# Patient Record
Sex: MALE | Race: BLACK OR AFRICAN AMERICAN
[De-identification: names, ages, dates, MRNs, and addresses within clinical notes are randomized per-mention and may not be internally consistent; named-entity substitution may affect disease eponyms.]

---

## 2019-09-03 ENCOUNTER — HOSPITAL ENCOUNTER (INPATIENT)
Dept: HOSPITAL 61 - ER | Age: 49
LOS: 1 days | Discharge: HOME | DRG: 313 | End: 2019-09-04
Attending: INTERNAL MEDICINE | Admitting: INTERNAL MEDICINE
Payer: COMMERCIAL

## 2019-09-03 VITALS — DIASTOLIC BLOOD PRESSURE: 68 MMHG | SYSTOLIC BLOOD PRESSURE: 141 MMHG

## 2019-09-03 VITALS — HEIGHT: 71 IN | BODY MASS INDEX: 29.7 KG/M2 | WEIGHT: 212.13 LBS

## 2019-09-03 DIAGNOSIS — Z82.49: ICD-10-CM

## 2019-09-03 DIAGNOSIS — Z87.891: ICD-10-CM

## 2019-09-03 DIAGNOSIS — R00.1: ICD-10-CM

## 2019-09-03 DIAGNOSIS — E78.5: ICD-10-CM

## 2019-09-03 DIAGNOSIS — R07.89: Primary | ICD-10-CM

## 2019-09-03 DIAGNOSIS — I10: ICD-10-CM

## 2019-09-03 LAB
ALBUMIN SERPL-MCNC: 3.9 G/DL (ref 3.4–5)
ALBUMIN/GLOB SERPL: 1.1 {RATIO} (ref 1–1.7)
ALP SERPL-CCNC: 89 U/L (ref 46–116)
ALT SERPL-CCNC: 35 U/L (ref 16–63)
ANION GAP SERPL CALC-SCNC: 11 MMOL/L (ref 6–14)
AST SERPL-CCNC: 24 U/L (ref 15–37)
BASOPHILS # BLD AUTO: 0.1 X10^3/UL (ref 0–0.2)
BASOPHILS NFR BLD: 1 % (ref 0–3)
BILIRUB SERPL-MCNC: 0.5 MG/DL (ref 0.2–1)
BUN SERPL-MCNC: 15 MG/DL (ref 8–26)
BUN/CREAT SERPL: 15 (ref 6–20)
CALCIUM SERPL-MCNC: 9.4 MG/DL (ref 8.5–10.1)
CHLORIDE SERPL-SCNC: 106 MMOL/L (ref 98–107)
CK SERPL-CCNC: 121 U/L (ref 39–308)
CO2 SERPL-SCNC: 25 MMOL/L (ref 21–32)
CREAT SERPL-MCNC: 1 MG/DL (ref 0.7–1.3)
EOSINOPHIL NFR BLD: 0.5 X10^3/UL (ref 0–0.7)
EOSINOPHIL NFR BLD: 4 % (ref 0–3)
ERYTHROCYTE [DISTWIDTH] IN BLOOD BY AUTOMATED COUNT: 14.3 % (ref 11.5–14.5)
GFR SERPLBLD BASED ON 1.73 SQ M-ARVRAT: 79.1 ML/MIN
GLOBULIN SER-MCNC: 3.6 G/DL (ref 2.2–3.8)
GLUCOSE SERPL-MCNC: 154 MG/DL (ref 70–99)
HCT VFR BLD CALC: 44.4 % (ref 39–53)
HGB BLD-MCNC: 15.4 G/DL (ref 13–17.5)
LIPASE: 167 U/L (ref 73–393)
LYMPHOCYTES # BLD: 1.9 X10^3/UL (ref 1–4.8)
LYMPHOCYTES NFR BLD AUTO: 14 % (ref 24–48)
MAGNESIUM SERPL-MCNC: 2 MG/DL (ref 1.8–2.4)
MCH RBC QN AUTO: 30 PG (ref 25–35)
MCHC RBC AUTO-ENTMCNC: 35 G/DL (ref 31–37)
MCV RBC AUTO: 88 FL (ref 79–100)
MONO #: 0.7 X10^3/UL (ref 0–1.1)
MONOCYTES NFR BLD: 5 % (ref 0–9)
NEUT #: 10.1 X10^3/UL (ref 1.8–7.7)
NEUTROPHILS NFR BLD AUTO: 76 % (ref 31–73)
PLATELET # BLD AUTO: 273 X10^3/UL (ref 140–400)
POTASSIUM SERPL-SCNC: 3.7 MMOL/L (ref 3.5–5.1)
PROT SERPL-MCNC: 7.5 G/DL (ref 6.4–8.2)
PROTHROMBIN TIME: 13.3 SEC (ref 11.7–14)
RBC # BLD AUTO: 5.08 X10^6/UL (ref 4.3–5.7)
SODIUM SERPL-SCNC: 142 MMOL/L (ref 136–145)
WBC # BLD AUTO: 13.3 X10^3/UL (ref 4–11)

## 2019-09-03 PROCEDURE — 80061 LIPID PANEL: CPT

## 2019-09-03 PROCEDURE — 83690 ASSAY OF LIPASE: CPT

## 2019-09-03 PROCEDURE — 85610 PROTHROMBIN TIME: CPT

## 2019-09-03 PROCEDURE — 84484 ASSAY OF TROPONIN QUANT: CPT

## 2019-09-03 PROCEDURE — G0378 HOSPITAL OBSERVATION PER HR: HCPCS

## 2019-09-03 PROCEDURE — 71045 X-RAY EXAM CHEST 1 VIEW: CPT

## 2019-09-03 PROCEDURE — 93005 ELECTROCARDIOGRAM TRACING: CPT

## 2019-09-03 PROCEDURE — 36415 COLL VENOUS BLD VENIPUNCTURE: CPT

## 2019-09-03 PROCEDURE — 82550 ASSAY OF CK (CPK): CPT

## 2019-09-03 PROCEDURE — 80053 COMPREHEN METABOLIC PANEL: CPT

## 2019-09-03 PROCEDURE — 85025 COMPLETE CBC W/AUTO DIFF WBC: CPT

## 2019-09-03 PROCEDURE — 70450 CT HEAD/BRAIN W/O DYE: CPT

## 2019-09-03 PROCEDURE — 83735 ASSAY OF MAGNESIUM: CPT

## 2019-09-03 PROCEDURE — 83880 ASSAY OF NATRIURETIC PEPTIDE: CPT

## 2019-09-03 PROCEDURE — 81001 URINALYSIS AUTO W/SCOPE: CPT

## 2019-09-03 PROCEDURE — 93306 TTE W/DOPPLER COMPLETE: CPT

## 2019-09-03 PROCEDURE — 80307 DRUG TEST PRSMV CHEM ANLYZR: CPT

## 2019-09-03 RX ADMIN — NITROGLYCERIN PRN MG: 0.4 TABLET SUBLINGUAL at 20:09

## 2019-09-03 RX ADMIN — NITROGLYCERIN PRN MG: 0.4 TABLET SUBLINGUAL at 20:01

## 2019-09-03 NOTE — RAD
CT Head W/O Contrast:

 

History: Dizziness

 

Comparison: none

 

Axial images were obtained without contrast.

 

The gray and white matter appears normal and symmetrical for the patients 

age.  There is no mass effect, extraaxial fluid collections or 

hydrocephalus.  There is no gross bleed.  There is no focal loss of 

gray-white matter distinction to suggest acute ischemia, i.e. stroke.

 

Impression:  No acute findings.

 

RS Compliance Statement:

 

One or more of the following individualized dose reduction techniques were

utilized for this examination:  

1. Automated exposure control  

2. Adjustment of the mA and/or kV according to patient size  

3. Use of iterative reconstruction technique

 

Electronically signed by: Phil Agrawal III, MD (9/3/2019 7:32 PM) [facility]

## 2019-09-03 NOTE — RAD
Exam: Chest one view

 

INDICATION: Chest pain

 

TECHNIQUE: Frontal view of the chest

 

Comparisons: None

 

FINDINGS:

The cardiomediastinal silhouette and pulmonary vessels are within normal 

limits.

 

The lung and pleural spaces are clear.

 

IMPRESSION:

No acute cardiopulmonary process.

 

Electronically signed by: Arielle Flaherty MD (9/3/2019 11:30 PM) [facility]

## 2019-09-04 VITALS — SYSTOLIC BLOOD PRESSURE: 144 MMHG | DIASTOLIC BLOOD PRESSURE: 71 MMHG

## 2019-09-04 VITALS
SYSTOLIC BLOOD PRESSURE: 121 MMHG | DIASTOLIC BLOOD PRESSURE: 63 MMHG | DIASTOLIC BLOOD PRESSURE: 63 MMHG | SYSTOLIC BLOOD PRESSURE: 121 MMHG

## 2019-09-04 VITALS — DIASTOLIC BLOOD PRESSURE: 69 MMHG | SYSTOLIC BLOOD PRESSURE: 120 MMHG

## 2019-09-04 VITALS — DIASTOLIC BLOOD PRESSURE: 79 MMHG | SYSTOLIC BLOOD PRESSURE: 140 MMHG

## 2019-09-04 LAB
AMPHETAMINE/METHAMPHETAMINE: (no result)
APTT PPP: (no result) S
BACTERIA #/AREA URNS HPF: 0 /HPF
BARBITURATES UR-MCNC: (no result) UG/ML
BENZODIAZ UR-MCNC: (no result) UG/L
BILIRUB UR QL STRIP: NEGATIVE
CANNABINOIDS UR-MCNC: (no result) UG/L
CHOLEST SERPL-MCNC: 232 MG/DL (ref 0–200)
CHOLEST/HDLC SERPL: 7 {RATIO}
COCAINE UR-MCNC: (no result) NG/ML
FIBRINOGEN PPP-MCNC: CLEAR MG/DL
HDLC SERPL-MCNC: 33 MG/DL (ref 40–60)
LDLC: 180 MG/DL (ref 0–100)
METHADONE SERPL-MCNC: (no result) NG/ML
NITRITE UR QL STRIP: NEGATIVE
OPIATES UR-MCNC: (no result) NG/ML
PCP SERPL-MCNC: (no result) MG/DL
PH UR STRIP: 5.5 [PH]
PROT UR STRIP-MCNC: NEGATIVE MG/DL
RBC #/AREA URNS HPF: 0 /HPF (ref 0–2)
TRIGL SERPL-MCNC: 95 MG/DL (ref 0–150)
UROBILINOGEN UR-MCNC: 1 MG/DL
VLDLC: 19 MG/DL (ref 0–40)
WBC #/AREA URNS HPF: 0 /HPF (ref 0–4)

## 2019-09-04 NOTE — PDOC1
History and Physical


Date of Admission


Date of Admission


DATE: 9/4/19 


TIME: 11:13





Identification/Chief Complaint


Chief Complaint


49  year old male who presents with complaining of dizziness and chest pain. 

Patient states he worked outside today and felt dizziness with nausea and 2 

episodes of vomiting.  Patient states the dizziness getting worse with movement 

of his head and standing and denies focal neuro deficit, headache, fever and 

chills, blurred vision, URI symptoms, change of hearing and tinnitus, history of

dizziness. Patient complaining of left sided chest pain as a heaviness and 

aching with radiation to his back as a constant that getting better and worse. 

Patient states the pain was 8 when it was started and changing to 7/10 without 

taking any medication or aspirin.. Patient denies palpitation, shortness of 

breath, cough and congestion, change of pain with taking deep breaths or 

position.





Past Medical History


Cardiovascular:  No pertinent hx


Pulmonary:  No pertinent hx


GI:  No pertinent hx


Heme/Onc:  No pertinent hx


Hepatobiliary:  No pertinent hx


Psych:  No pertinent hx


Rheumatologic:  No pertinent hx


Infectious disease:  No pertinent hx


ENT:  No pertinent hx


Renal/:  No pertinent hx


Endocrine:  No pertinent hx


Dermatology:  No pertinent hx





Past Surgical History


Past Surgical History:  No pertinent history





Family History


Family History:  Heart Disease (father )





Social History


Smoke:  Quit (quit smoking 12/2018. No vapes daily )


ALCOHOL:  occassional


Drugs:  None





Current Problem List


Problem List


Problems


Medical Problems:


(1) Acute chest pain


Status: Acute  





(2) Dizziness


Status: Acute  





(3) Heart murmur


Status: Acute  





(4) Nausea and vomiting


Status: Acute  











Current Medications


Current Medications





Current Medications


Aspirin (Children'S Aspirin) 324 mg 1X  ONCE PO  Last administered on 9/3/19at 

19:29;  Start 9/3/19 at 19:00;  Stop 9/3/19 at 19:15;  Status DC


Nitroglycerin (Nitrostat) 0.4 mg PRN Q5MIN  PRN SL CP RATING > 1/10 Last 

administered on 9/3/19at 20:10;  Start 9/3/19 at 19:00;  Stop 9/4/19 at 18:59


Sodium Chloride 1,000 ml @  1,000 mls/hr Q1H IV  Last administered on 9/3/19at 

19:24;  Start 9/3/19 at 18:59;  Stop 9/3/19 at 19:58;  Status DC


Aspirin (Ecotrin) 81 mg DAILYWBKFT PO  Last administered on 9/4/19at 10:55;  

Start 9/4/19 at 10:00





Active Scripts


Active


Reported


Multivitamins (Multivitamin) 1 Each Tablet 1 Tab PO DAILY





Allergies


Allergies:  


Coded Allergies:  


     No Known Drug Allergies (Unverified , 9/3/19)





ROS


Review of System





Review of Systems


Review of Systems





Constitutional: Denies fever or chills []


Eyes: Denies change in visual acuity, redness, or eye pain []


HENT: Denies nasal congestion or sore throat []


Respiratory: Denies cough or shortness of breath []


Cardiovascular: No additional information not addressed in HPI []


GI: Denies abdominal pain, bloody stools or diarrhea, reports nausea and 

vomiting []


: Denies dysuria or hematuria []


Musculoskeletal: Denies back pain or joint pain []


Integument: Denies rash or skin lesions []


Neurologic: Denies headache, focal weakness or sensory changes, reports 

dizziness []


Endocrine: Denies polyuria or polydipsia []





14 PT  systems were reviewed and found to be within normal limits, except as 

documented





Physical Exam


Physical Exam





Review of Systems


Review of Systems





Constitutional: Denies fever or chills []


Eyes: Denies change in visual acuity, redness, or eye pain []


HENT: Denies nasal congestion or sore throat []


Respiratory: Denies cough or shortness of breath []


Cardiovascular: No additional information not addressed in HPI []


GI: Denies abdominal pain, bloody stools or diarrhea, reports nausea and 

vomiting []


: Denies dysuria or hematuria []


Musculoskeletal: Denies back pain or joint pain []


Integument: Denies rash or skin lesions []


Neurologic: Denies headache, focal weakness or sensory changes, reports 

dizziness []


Endocrine: Denies polyuria or polydipsia []











Physical Exam


Physical Exam








Constitutional: Well developed, well nourished, mild distress, non-toxic appea

monika. []


HENT: Normocephalic, atraumatic.


Eyes: PERRLA, EOMI, conjunctiva normal, no discharge. [] 


Neck: Normal range of motion, no tenderness, supple, no stridor. [] 


Cardiovascular: Bradycardia, grade 2 holodiastolic murmur []


Lungs & Thorax:  Bilateral breath sounds clear to auscultation []


Abdomen: Bowel sounds normal, soft, no tenderness, no masses, no pulsatile 

masses. [] 


Skin: Warm, dry, no erythema, no rash. [] 


Back: No tenderness, no CVA tenderness. [] 


Extremities: No tenderness, no cyanosis, no clubbing, ROM intact, no edema. [] 


Neurologic: Alert and oriented X 3, no focal deficits noted. []


Psychologic: Affect normal, judgement normal, mood normal. []





All other systems were reviewed and found to be within normal limits, except as 

documented in this note.


Rectal Exam:  not examined


Extremities:  No cyanosis


Neuro:  Cranial nerves 3-12 NL


Psych/Mental Status:  Mood NL





Vitals


Vitals





Vital Signs








  Date Time  Temp Pulse Resp B/P (MAP) Pulse Ox O2 Delivery O2 Flow Rate FiO2


 


9/4/19 07:00 98.4 49 18 140/79 (99) 95 Room Air  





 98.4       











Labs


Labs





Laboratory Tests








Test


 9/3/19


18:55 9/3/19


19:35 9/3/19


23:40 9/4/19


02:35


 


White Blood Count


 13.3 x10^3/uL


(4.0-11.0) 


 


 





 


Red Blood Count


 5.08 x10^6/uL


(4.30-5.70) 


 


 





 


Hemoglobin


 15.4 g/dL


(13.0-17.5) 


 


 





 


Hematocrit


 44.4 %


(39.0-53.0) 


 


 





 


Mean Corpuscular Volume 88 fL ()    


 


Mean Corpuscular Hemoglobin 30 pg (25-35)    


 


Mean Corpuscular Hemoglobin


Concent 35 g/dL


(31-37) 


 


 





 


Red Cell Distribution Width


 14.3 %


(11.5-14.5) 


 


 





 


Platelet Count


 273 x10^3/uL


(140-400) 


 


 





 


Neutrophils (%) (Auto) 76 % (31-73)    


 


Lymphocytes (%) (Auto) 14 % (24-48)    


 


Monocytes (%) (Auto) 5 % (0-9)    


 


Eosinophils (%) (Auto) 4 % (0-3)    


 


Basophils (%) (Auto) 1 % (0-3)    


 


Neutrophils # (Auto)


 10.1 x10^3/uL


(1.8-7.7) 


 


 





 


Lymphocytes # (Auto)


 1.9 x10^3/uL


(1.0-4.8) 


 


 





 


Monocytes # (Auto)


 0.7 x10^3/uL


(0.0-1.1) 


 


 





 


Eosinophils # (Auto)


 0.5 x10^3/uL


(0.0-0.7) 


 


 





 


Basophils # (Auto)


 0.1 x10^3/uL


(0.0-0.2) 


 


 





 


Sodium Level


 142 mmol/L


(136-145) 


 


 





 


Potassium Level


 3.7 mmol/L


(3.5-5.1) 


 


 





 


Chloride Level


 106 mmol/L


() 


 


 





 


Carbon Dioxide Level


 25 mmol/L


(21-32) 


 


 





 


Anion Gap 11 (6-14)    


 


Blood Urea Nitrogen


 15 mg/dL


(8-26) 


 


 





 


Creatinine


 1.0 mg/dL


(0.7-1.3) 


 


 





 


Estimated GFR


(Cockcroft-Gault) 79.1 


 


 


 





 


BUN/Creatinine Ratio 15 (6-20)    


 


Glucose Level


 154 mg/dL


(70-99) 


 


 





 


Calcium Level


 9.4 mg/dL


(8.5-10.1) 


 


 





 


Magnesium Level


 2.0 mg/dL


(1.8-2.4) 


 


 





 


Total Bilirubin


 0.5 mg/dL


(0.2-1.0) 


 


 





 


Aspartate Amino Transf


(AST/SGOT) 24 U/L (15-37) 


 


 


 





 


Alanine Aminotransferase


(ALT/SGPT) 35 U/L (16-63) 


 


 


 





 


Alkaline Phosphatase


 89 U/L


() 


 


 





 


Creatine Kinase


 121 U/L


() 


 


 





 


Troponin I Quantitative


 < 0.017 ng/mL


(0.000-0.055) 


 < 0.017 ng/mL


(0.000-0.055) < 0.017 ng/mL


(0.000-0.055)


 


NT-Pro-B-Type Natriuretic


Peptide 28 pg/mL


(0-124) 


 


 





 


Total Protein


 7.5 g/dL


(6.4-8.2) 


 


 





 


Albumin


 3.9 g/dL


(3.4-5.0) 


 


 





 


Albumin/Globulin Ratio 1.1 (1.0-1.7)    


 


Lipase


 167 U/L


() 


 


 





 


Prothrombin Time


 


 13.3 SEC


(11.7-14.0) 


 





 


Prothromb Time International


Ratio 


 1.0 (0.8-1.1) 


 


 





 


Triglycerides Level


 


 


 


 95 mg/dL


(0-150)


 


Cholesterol Level


 


 


 


 232 mg/dL


(0-200)


 


LDL Cholesterol, Calculated


 


 


 


 180 mg/dL


(0-100)


 


VLDL Cholesterol, Calculated


 


 


 


 19 mg/dL


(0-40)


 


Non-HDL Cholesterol Calculated


 


 


 


 199 mg/dL


(0-129)


 


HDL Cholesterol


 


 


 


 33 mg/dL


(40-60)


 


Cholesterol/HDL Ratio    7.0 


 


Test


 9/4/19


06:26 


 


 





 


Urine Collection Type Unknown    


 


Urine Color Linda    


 


Urine Clarity Clear    


 


Urine pH 5.5    


 


Urine Specific Gravity >=1.030    


 


Urine Protein


 Negative mg/dL


(NEG-TRACE) 


 


 





 


Urine Glucose (UA)


 Negative mg/dL


(NEG) 


 


 





 


Urine Ketones (Stick)


 Negative mg/dL


(NEG) 


 


 





 


Urine Blood Negative (NEG)    


 


Urine Nitrite Negative (NEG)    


 


Urine Bilirubin Negative (NEG)    


 


Urine Urobilinogen Dipstick


 1.0 mg/dL (0.2


mg/dL) 


 


 





 


Urine Leukocyte Esterase Negative (NEG)    


 


Urine RBC 0 /HPF (0-2)    


 


Urine WBC 0 /HPF (0-4)    


 


Urine Bacteria 0 /HPF (0-FEW)    


 


Urine Mucus Marked /LPF    


 


Urine Opiates Screen Neg (NEG)    


 


Urine Methadone Screen Neg (NEG)    


 


Urine Barbiturates Neg (NEG)    


 


Urine Phencyclidine Screen Neg (NEG)    


 


Urine


Amphetamine/Methamphetamine Neg (NEG) 


 


 


 





 


Urine Benzodiazepines Screen Neg (NEG)    


 


Urine Cocaine Screen Neg (NEG)    


 


Urine Cannabinoids Screen Neg (NEG)    


 


Urine Ethyl Alcohol Neg (NEG)    








Laboratory Tests








Test


 9/3/19


18:55 9/3/19


19:35 9/3/19


23:40 9/4/19


02:35


 


White Blood Count


 13.3 x10^3/uL


(4.0-11.0) 


 


 





 


Red Blood Count


 5.08 x10^6/uL


(4.30-5.70) 


 


 





 


Hemoglobin


 15.4 g/dL


(13.0-17.5) 


 


 





 


Hematocrit


 44.4 %


(39.0-53.0) 


 


 





 


Mean Corpuscular Volume 88 fL ()    


 


Mean Corpuscular Hemoglobin 30 pg (25-35)    


 


Mean Corpuscular Hemoglobin


Concent 35 g/dL


(31-37) 


 


 





 


Red Cell Distribution Width


 14.3 %


(11.5-14.5) 


 


 





 


Platelet Count


 273 x10^3/uL


(140-400) 


 


 





 


Neutrophils (%) (Auto) 76 % (31-73)    


 


Lymphocytes (%) (Auto) 14 % (24-48)    


 


Monocytes (%) (Auto) 5 % (0-9)    


 


Eosinophils (%) (Auto) 4 % (0-3)    


 


Basophils (%) (Auto) 1 % (0-3)    


 


Neutrophils # (Auto)


 10.1 x10^3/uL


(1.8-7.7) 


 


 





 


Lymphocytes # (Auto)


 1.9 x10^3/uL


(1.0-4.8) 


 


 





 


Monocytes # (Auto)


 0.7 x10^3/uL


(0.0-1.1) 


 


 





 


Eosinophils # (Auto)


 0.5 x10^3/uL


(0.0-0.7) 


 


 





 


Basophils # (Auto)


 0.1 x10^3/uL


(0.0-0.2) 


 


 





 


Sodium Level


 142 mmol/L


(136-145) 


 


 





 


Potassium Level


 3.7 mmol/L


(3.5-5.1) 


 


 





 


Chloride Level


 106 mmol/L


() 


 


 





 


Carbon Dioxide Level


 25 mmol/L


(21-32) 


 


 





 


Anion Gap 11 (6-14)    


 


Blood Urea Nitrogen


 15 mg/dL


(8-26) 


 


 





 


Creatinine


 1.0 mg/dL


(0.7-1.3) 


 


 





 


Estimated GFR


(Cockcroft-Gault) 79.1 


 


 


 





 


BUN/Creatinine Ratio 15 (6-20)    


 


Glucose Level


 154 mg/dL


(70-99) 


 


 





 


Calcium Level


 9.4 mg/dL


(8.5-10.1) 


 


 





 


Magnesium Level


 2.0 mg/dL


(1.8-2.4) 


 


 





 


Total Bilirubin


 0.5 mg/dL


(0.2-1.0) 


 


 





 


Aspartate Amino Transf


(AST/SGOT) 24 U/L (15-37) 


 


 


 





 


Alanine Aminotransferase


(ALT/SGPT) 35 U/L (16-63) 


 


 


 





 


Alkaline Phosphatase


 89 U/L


() 


 


 





 


Creatine Kinase


 121 U/L


() 


 


 





 


Troponin I Quantitative


 < 0.017 ng/mL


(0.000-0.055) 


 < 0.017 ng/mL


(0.000-0.055) < 0.017 ng/mL


(0.000-0.055)


 


NT-Pro-B-Type Natriuretic


Peptide 28 pg/mL


(0-124) 


 


 





 


Total Protein


 7.5 g/dL


(6.4-8.2) 


 


 





 


Albumin


 3.9 g/dL


(3.4-5.0) 


 


 





 


Albumin/Globulin Ratio 1.1 (1.0-1.7)    


 


Lipase


 167 U/L


() 


 


 





 


Prothrombin Time


 


 13.3 SEC


(11.7-14.0) 


 





 


Prothromb Time International


Ratio 


 1.0 (0.8-1.1) 


 


 





 


Triglycerides Level


 


 


 


 95 mg/dL


(0-150)


 


Cholesterol Level


 


 


 


 232 mg/dL


(0-200)


 


LDL Cholesterol, Calculated


 


 


 


 180 mg/dL


(0-100)


 


VLDL Cholesterol, Calculated


 


 


 


 19 mg/dL


(0-40)


 


Non-HDL Cholesterol Calculated


 


 


 


 199 mg/dL


(0-129)


 


HDL Cholesterol


 


 


 


 33 mg/dL


(40-60)


 


Cholesterol/HDL Ratio    7.0 


 


Test


 9/4/19


06:26 


 


 





 


Urine Collection Type Unknown    


 


Urine Color Linda    


 


Urine Clarity Clear    


 


Urine pH 5.5    


 


Urine Specific Gravity >=1.030    


 


Urine Protein


 Negative mg/dL


(NEG-TRACE) 


 


 





 


Urine Glucose (UA)


 Negative mg/dL


(NEG) 


 


 





 


Urine Ketones (Stick)


 Negative mg/dL


(NEG) 


 


 





 


Urine Blood Negative (NEG)    


 


Urine Nitrite Negative (NEG)    


 


Urine Bilirubin Negative (NEG)    


 


Urine Urobilinogen Dipstick


 1.0 mg/dL (0.2


mg/dL) 


 


 





 


Urine Leukocyte Esterase Negative (NEG)    


 


Urine RBC 0 /HPF (0-2)    


 


Urine WBC 0 /HPF (0-4)    


 


Urine Bacteria 0 /HPF (0-FEW)    


 


Urine Mucus Marked /LPF    


 


Urine Opiates Screen Neg (NEG)    


 


Urine Methadone Screen Neg (NEG)    


 


Urine Barbiturates Neg (NEG)    


 


Urine Phencyclidine Screen Neg (NEG)    


 


Urine


Amphetamine/Methamphetamine Neg (NEG) 


 


 


 





 


Urine Benzodiazepines Screen Neg (NEG)    


 


Urine Cocaine Screen Neg (NEG)    


 


Urine Cannabinoids Screen Neg (NEG)    


 


Urine Ethyl Alcohol Neg (NEG)    











VTE Prophylaxis Ordered


VTE Prophylaxis Devices:  No


VTE Pharmacological Prophylaxi:  Yes





Assessment/Plan


Assessment/Plan


Impression:  





   chest pain, atypical


   Dizziness


   Nausea and vomiting


   HYPERLIPIDEMIA


   Dizziness; // sinus bradycardia.cont tele 


   











ADMITTED








[facility] BED


Cardiology consult














59 MIN PT EXAM, CHART REVIEW, > 50% OF TIME SPENT WITH EXAM, CHART REVIEW, PT 

CARE COORDINATION











EVERARDO HANNON MD           Sep 4, 2019 11:13

## 2019-09-04 NOTE — PDOC3
Discharge Summary


Date of Admission:  Sep 3, 2019


Date of Discharge:  Sep 4, 2019


Follow-Up:  3-5 days


Admitting Diagnosis comment:





VTE Prophylaxis Ordered


VTE Prophylaxis Devices:  No


VTE Pharmacological Prophylaxi:  Yes





discharge dx -==============


Assessment/Plan


Impression:  





   chest pain, atypical


   Dizziness


   Nausea and vomiting resolved


   HYPERLIPIDEMIA


   Dizziness; // sinus bradycardia.cont tele 


   











ADMITTED








[facility] BED


Cardiology consult ok with d/c verbal order














59 MIN PT EXAM, CHART REVIEW, > 50% OF TIME SPENT WITH EXAM, CHART REVIEW, PT 

CARE COORDINATION


FINAL DIAGNOSIS


Problems


Medical Problems:


(1) Acute chest pain


Status: Acute  





(2) Dizziness


Status: Acute  





(3) Heart murmur


Status: Acute  





(4) Nausea and vomiting


Status: Acute  








Brief Hospital Course


Mr. Lo  is a 49 old [sex] who presented with [ chest pain]


CONDITION AT DISCHARGE:  Improved


Discharge Medications





Current Medications


Aspirin (Children'S Aspirin) 324 mg 1X  ONCE PO  Last administered on 9/3/19at 

19:29;  Start 9/3/19 at 19:00;  Stop 9/3/19 at 19:15;  Status DC


Nitroglycerin (Nitrostat) 0.4 mg PRN Q5MIN  PRN SL CP RATING > 1/10 Last 

administered on 9/3/19at 20:10;  Start 9/3/19 at 19:00;  Stop 9/4/19 at 18:59;  

Status DC


Sodium Chloride 1,000 ml @  1,000 mls/hr Q1H IV  Last administered on 9/3/19at 

19:24;  Start 9/3/19 at 18:59;  Stop 9/3/19 at 19:58;  Status DC


Aspirin (Ecotrin) 81 mg DAILYWBKFT PO  Last administered on 9/4/19at 10:55;  

Start 9/4/19 at 10:00





Active Scripts


Active


Reported


Multivitamins (Multivitamin) 1 Each Tablet 1 Tab PO DAILY


Vital Signs





Vital Signs








  Date Time  Temp Pulse Resp B/P (MAP) Pulse Ox O2 Delivery O2 Flow Rate FiO2


 


9/4/19 15:00 98.3 47 18 121/63 (82) 95 Room Air  





 98.3       








Labs





Laboratory Tests








Test


 9/3/19


18:55 9/3/19


19:35 9/3/19


23:40 9/4/19


02:35


 


White Blood Count


 13.3 x10^3/uL


(4.0-11.0) 


 


 





 


Red Blood Count


 5.08 x10^6/uL


(4.30-5.70) 


 


 





 


Hemoglobin


 15.4 g/dL


(13.0-17.5) 


 


 





 


Hematocrit


 44.4 %


(39.0-53.0) 


 


 





 


Mean Corpuscular Volume 88 fL ()    


 


Mean Corpuscular Hemoglobin 30 pg (25-35)    


 


Mean Corpuscular Hemoglobin


Concent 35 g/dL


(31-37) 


 


 





 


Red Cell Distribution Width


 14.3 %


(11.5-14.5) 


 


 





 


Platelet Count


 273 x10^3/uL


(140-400) 


 


 





 


Neutrophils (%) (Auto) 76 % (31-73)    


 


Lymphocytes (%) (Auto) 14 % (24-48)    


 


Monocytes (%) (Auto) 5 % (0-9)    


 


Eosinophils (%) (Auto) 4 % (0-3)    


 


Basophils (%) (Auto) 1 % (0-3)    


 


Neutrophils # (Auto)


 10.1 x10^3/uL


(1.8-7.7) 


 


 





 


Lymphocytes # (Auto)


 1.9 x10^3/uL


(1.0-4.8) 


 


 





 


Monocytes # (Auto)


 0.7 x10^3/uL


(0.0-1.1) 


 


 





 


Eosinophils # (Auto)


 0.5 x10^3/uL


(0.0-0.7) 


 


 





 


Basophils # (Auto)


 0.1 x10^3/uL


(0.0-0.2) 


 


 





 


Sodium Level


 142 mmol/L


(136-145) 


 


 





 


Potassium Level


 3.7 mmol/L


(3.5-5.1) 


 


 





 


Chloride Level


 106 mmol/L


() 


 


 





 


Carbon Dioxide Level


 25 mmol/L


(21-32) 


 


 





 


Anion Gap 11 (6-14)    


 


Blood Urea Nitrogen


 15 mg/dL


(8-26) 


 


 





 


Creatinine


 1.0 mg/dL


(0.7-1.3) 


 


 





 


Estimated GFR


(Cockcroft-Gault) 79.1 


 


 


 





 


BUN/Creatinine Ratio 15 (6-20)    


 


Glucose Level


 154 mg/dL


(70-99) 


 


 





 


Calcium Level


 9.4 mg/dL


(8.5-10.1) 


 


 





 


Magnesium Level


 2.0 mg/dL


(1.8-2.4) 


 


 





 


Total Bilirubin


 0.5 mg/dL


(0.2-1.0) 


 


 





 


Aspartate Amino Transf


(AST/SGOT) 24 U/L (15-37) 


 


 


 





 


Alanine Aminotransferase


(ALT/SGPT) 35 U/L (16-63) 


 


 


 





 


Alkaline Phosphatase


 89 U/L


() 


 


 





 


Creatine Kinase


 121 U/L


() 


 


 





 


Troponin I Quantitative


 < 0.017 ng/mL


(0.000-0.055) 


 < 0.017 ng/mL


(0.000-0.055) < 0.017 ng/mL


(0.000-0.055)


 


NT-Pro-B-Type Natriuretic


Peptide 28 pg/mL


(0-124) 


 


 





 


Total Protein


 7.5 g/dL


(6.4-8.2) 


 


 





 


Albumin


 3.9 g/dL


(3.4-5.0) 


 


 





 


Albumin/Globulin Ratio 1.1 (1.0-1.7)    


 


Lipase


 167 U/L


() 


 


 





 


Prothrombin Time


 


 13.3 SEC


(11.7-14.0) 


 





 


Prothromb Time International


Ratio 


 1.0 (0.8-1.1) 


 


 





 


Triglycerides Level


 


 


 


 95 mg/dL


(0-150)


 


Cholesterol Level


 


 


 


 232 mg/dL


(0-200)


 


LDL Cholesterol, Calculated


 


 


 


 180 mg/dL


(0-100)


 


VLDL Cholesterol, Calculated


 


 


 


 19 mg/dL


(0-40)


 


Non-HDL Cholesterol Calculated


 


 


 


 199 mg/dL


(0-129)


 


HDL Cholesterol


 


 


 


 33 mg/dL


(40-60)


 


Cholesterol/HDL Ratio    7.0 


 


Test


 9/4/19


06:26 


 


 





 


Urine Collection Type Unknown    


 


Urine Color Linda    


 


Urine Clarity Clear    


 


Urine pH 5.5    


 


Urine Specific Gravity >=1.030    


 


Urine Protein


 Negative mg/dL


(NEG-TRACE) 


 


 





 


Urine Glucose (UA)


 Negative mg/dL


(NEG) 


 


 





 


Urine Ketones (Stick)


 Negative mg/dL


(NEG) 


 


 





 


Urine Blood Negative (NEG)    


 


Urine Nitrite Negative (NEG)    


 


Urine Bilirubin Negative (NEG)    


 


Urine Urobilinogen Dipstick


 1.0 mg/dL (0.2


mg/dL) 


 


 





 


Urine Leukocyte Esterase Negative (NEG)    


 


Urine RBC 0 /HPF (0-2)    


 


Urine WBC 0 /HPF (0-4)    


 


Urine Bacteria 0 /HPF (0-FEW)    


 


Urine Mucus Marked /LPF    


 


Urine Opiates Screen Neg (NEG)    


 


Urine Methadone Screen Neg (NEG)    


 


Urine Barbiturates Neg (NEG)    


 


Urine Phencyclidine Screen Neg (NEG)    


 


Urine


Amphetamine/Methamphetamine Neg (NEG) 


 


 


 





 


Urine Benzodiazepines Screen Neg (NEG)    


 


Urine Cocaine Screen Neg (NEG)    


 


Urine Cannabinoids Screen Neg (NEG)    


 


Urine Ethyl Alcohol Neg (NEG)    








Laboratory Tests








Test


 9/3/19


19:35 9/3/19


23:40 9/4/19


02:35 9/4/19


06:26


 


Prothrombin Time


 13.3 SEC


(11.7-14.0) 


 


 





 


Prothromb Time International


Ratio 1.0 (0.8-1.1) 


 


 


 





 


Troponin I Quantitative


 


 < 0.017 ng/mL


(0.000-0.055) < 0.017 ng/mL


(0.000-0.055) 





 


Triglycerides Level


 


 


 95 mg/dL


(0-150) 





 


Cholesterol Level


 


 


 232 mg/dL


(0-200) 





 


LDL Cholesterol, Calculated


 


 


 180 mg/dL


(0-100) 





 


VLDL Cholesterol, Calculated


 


 


 19 mg/dL


(0-40) 





 


Non-HDL Cholesterol Calculated


 


 


 199 mg/dL


(0-129) 





 


HDL Cholesterol


 


 


 33 mg/dL


(40-60) 





 


Cholesterol/HDL Ratio   7.0  


 


Urine Collection Type    Unknown 


 


Urine Color    Linda 


 


Urine Clarity    Clear 


 


Urine pH    5.5 


 


Urine Specific Gravity    >=1.030 


 


Urine Protein


 


 


 


 Negative mg/dL


(NEG-TRACE)


 


Urine Glucose (UA)


 


 


 


 Negative mg/dL


(NEG)


 


Urine Ketones (Stick)


 


 


 


 Negative mg/dL


(NEG)


 


Urine Blood    Negative (NEG) 


 


Urine Nitrite    Negative (NEG) 


 


Urine Bilirubin    Negative (NEG) 


 


Urine Urobilinogen Dipstick


 


 


 


 1.0 mg/dL (0.2


mg/dL)


 


Urine Leukocyte Esterase    Negative (NEG) 


 


Urine RBC    0 /HPF (0-2) 


 


Urine WBC    0 /HPF (0-4) 


 


Urine Bacteria    0 /HPF (0-FEW) 


 


Urine Mucus    Marked /LPF 


 


Urine Opiates Screen    Neg (NEG) 


 


Urine Methadone Screen    Neg (NEG) 


 


Urine Barbiturates    Neg (NEG) 


 


Urine Phencyclidine Screen    Neg (NEG) 


 


Urine


Amphetamine/Methamphetamine 


 


 


 Neg (NEG) 





 


Urine Benzodiazepines Screen    Neg (NEG) 


 


Urine Cocaine Screen    Neg (NEG) 


 


Urine Cannabinoids Screen    Neg (NEG) 


 


Urine Ethyl Alcohol    Neg (NEG) 








Allergies





                                    Allergies








Coded Allergies Type Severity Reaction Last Updated Verified


 


  No Known Drug Allergies    9/3/19 No








Disposition/Orders:  D/C to Home


Patient Instructions


d/c planning 62 min











EVERARDO HANNON MD           Sep 4, 2019 19:01

## 2019-09-04 NOTE — PDOC2
GUEVARA CHRISTENSEN APRN 9/4/19 0913:


CARDIAC CONSULT


DATE OF CONSULT


Date of Consult


DATE: 9/4/19 


TIME: 09:07





REASON FOR CONSULT


Reason for Consult:


Chest pain





REFERRING PHYSICIAN


Referring Physician:


Dr. Ramirez





SOURCE


Source:  Chart review, Patient





HISTORY OF PRESENT ILLNESS


HISTORY OF PRESENT ILLNESS


This is a 48 yo male who presented secondary to dizziness and chest pain. 

Patient reports he ate lunch yesterday and began feeling dizzy, lightheaded. 

Became diaphoretic. Dizziness seemed to subside, but then began feeling 

nauseated. Drove back to work. We he arrived, had episode of vomiting x1. 

Started having pressure in his left chest. No associated shortness of breath or 

palpitations. Went home and still had some slight chest pressure so he decided 

to come to the ED for further evaluation and treatment. Pain resolved with nitro

in ED and has had no further pain since. Had episode of dizziness with nausea 

last week as well at work. No chest pain at that time. No recent chest pain with

exertion or SMITH. No previous medical history although has not been to see a 

provider in the last 5 years or so.





PAST MEDICAL HISTORY


Cardiovascular:  No pertinent hx


Pulmonary:  No pertinent hx


GI:  No pertinent hx


Heme/Onc:  No pertinent hx


Hepatobiliary:  No pertinent hx


Psych:  No pertinent hx


Rheumatologic:  No pertinent hx


Infectious disease:  No pertinent hx


ENT:  No pertinent hx


Renal/:  No pertinent hx


Endocrine:  No pertinent hx


Dermatology:  No pertinent hx





PAST SURGICAL HISTORY


Past Surgical History:  No pertinent history





FAMILY HISTORY


Family History:  Heart Disease (father )





SOCIAL HISTORY


Smoke:  Quit (quit smoking 12/2018. No vapes daily )


ALCOHOL:  occassional


Drugs:  None


Lives:  with Family





CURRENT MEDICATIONS


CURRENT MEDICATIONS





Current Medications








 Medications


  (Trade)  Dose


 Ordered  Sig/Kandice


 Route


 PRN Reason  Start Time


 Stop Time Status Last Admin


Dose Admin


 


 Aspirin


  ([facility]


 Aspirin)  324 mg  1X  ONCE


 PO


   9/3/19 19:00


 9/3/19 19:15 DC 9/3/19 19:29





 


 Nitroglycerin


  (Nitrostat)  0.4 mg  PRN Q5MIN  PRN


 SL


 CP RATING > 1/10  9/3/19 19:00


 9/4/19 18:59  9/3/19 20:10





 


 Sodium Chloride  1,000 ml @ 


 1,000 mls/hr  Q1H


 IV


   9/3/19 18:59


 9/3/19 19:58 DC 9/3/19 19:24














ALLERGIES


ALLERGIES:  


Coded Allergies:  


     No Known Drug Allergies (Unverified , 9/3/19)





ROS


Review of System


14 point ROS conducted with pertinent positives noted above in HPI.





PHYSICAL EXAM


General:  Alert, Oriented X3, Cooperative, No acute distress


HEENT:  Atraumatic, Mucous membr. moist/pink


Lungs:  Clear to auscultation, Normal air movement


Heart:  Regular rate (SB- rate 50 ), Normal S1, Normal S2, Other (2/6 systolic 

murmur )


Abdomen:  Soft, No tenderness


Extremities:  No edema, Normal pulses


Skin:  No significant lesion


Neuro:  Normal speech, Sensation intact


Psych/Mental Status:  Mental status NL, Mood NL


MUSCULOSKELETAL:  No deformity





VITALS/I&O


VITALS/I&O:





                                   Vital Signs








  Date Time  Temp Pulse Resp B/P (MAP) Pulse Ox O2 Delivery O2 Flow Rate FiO2


 


9/4/19 07:00 98.4 49 18 140/79 (99) 95 Room Air  





 98.4       














                                    I & O   


 


 9/3/19 9/3/19 9/4/19





 14:59 22:59 06:59


 


Intake Total   120 ml


 


Output Total   350 ml


 


Balance   -230 ml











LABS


Lab:





                                Laboratory Tests








Test


 9/3/19


18:55 9/3/19


19:35 9/3/19


23:40 9/4/19


02:35


 


White Blood Count


 13.3 x10^3/uL


(4.0-11.0)  H 


 


 





 


Red Blood Count


 5.08 x10^6/uL


(4.30-5.70) 


 


 





 


Hemoglobin


 15.4 g/dL


(13.0-17.5) 


 


 





 


Hematocrit


 44.4 %


(39.0-53.0) 


 


 





 


Mean Corpuscular Volume


 88 fL ()


 


 


 





 


Mean Corpuscular Hemoglobin 30 pg (25-35)     


 


Mean Corpuscular Hemoglobin


Concent 35 g/dL


(31-37) 


 


 





 


Red Cell Distribution Width


 14.3 %


(11.5-14.5) 


 


 





 


Platelet Count


 273 x10^3/uL


(140-400) 


 


 





 


Neutrophils (%) (Auto) 76 % (31-73)  H   


 


Lymphocytes (%) (Auto) 14 % (24-48)  L   


 


Monocytes (%) (Auto) 5 % (0-9)     


 


Eosinophils (%) (Auto) 4 % (0-3)  H   


 


Basophils (%) (Auto) 1 % (0-3)     


 


Neutrophils # (Auto)


 10.1 x10^3/uL


(1.8-7.7)  H 


 


 





 


Lymphocytes # (Auto)


 1.9 x10^3/uL


(1.0-4.8) 


 


 





 


Monocytes # (Auto)


 0.7 x10^3/uL


(0.0-1.1) 


 


 





 


Eosinophils # (Auto)


 0.5 x10^3/uL


(0.0-0.7) 


 


 





 


Basophils # (Auto)


 0.1 x10^3/uL


(0.0-0.2) 


 


 





 


Sodium Level


 142 mmol/L


(136-145) 


 


 





 


Potassium Level


 3.7 mmol/L


(3.5-5.1) 


 


 





 


Chloride Level


 106 mmol/L


() 


 


 





 


Carbon Dioxide Level


 25 mmol/L


(21-32) 


 


 





 


Anion Gap 11 (6-14)     


 


Blood Urea Nitrogen


 15 mg/dL


(8-26) 


 


 





 


Creatinine


 1.0 mg/dL


(0.7-1.3) 


 


 





 


Estimated GFR


(Cockcroft-Gault) 79.1  


 


 


 





 


BUN/Creatinine Ratio 15 (6-20)     


 


Glucose Level


 154 mg/dL


(70-99)  H 


 


 





 


Calcium Level


 9.4 mg/dL


(8.5-10.1) 


 


 





 


Magnesium Level


 2.0 mg/dL


(1.8-2.4) 


 


 





 


Total Bilirubin


 0.5 mg/dL


(0.2-1.0) 


 


 





 


Aspartate Amino Transferase


(AST) 24 U/L (15-37)


 


 


 





 


Alanine Aminotransferase (ALT)


 35 U/L (16-63)


 


 


 





 


Alkaline Phosphatase


 89 U/L


() 


 


 





 


Creatine Kinase


 121 U/L


() 


 


 





 


Troponin I Quantitative


 < 0.017 ng/mL


(0.000-0.055) 


 < 0.017 ng/mL


(0.000-0.055) < 0.017 ng/mL


(0.000-0.055)


 


NT-Pro-B-Type Natriuretic


Peptide 28 pg/mL


(0-124) 


 


 





 


Total Protein


 7.5 g/dL


(6.4-8.2) 


 


 





 


Albumin


 3.9 g/dL


(3.4-5.0) 


 


 





 


Albumin/Globulin Ratio 1.1 (1.0-1.7)     


 


Lipase


 167 U/L


() 


 


 





 


Prothrombin Time


 


 13.3 SEC


(11.7-14.0) 


 





 


Prothrombin Time INR  1.0 (0.8-1.1)    


 


Test


 9/4/19


06:26 


 


 





 


Urine Collection Type Unknown     


 


Urine Color Linda     


 


Urine Clarity Clear     


 


Urine pH 5.5     


 


Urine Specific Gravity >=1.030     


 


Urine Protein


 Negative mg/dL


(NEG-TRACE) 


 


 





 


Urine Glucose (UA)


 Negative mg/dL


(NEG) 


 


 





 


Urine Ketones (Stick)


 Negative mg/dL


(NEG) 


 


 





 


Urine Blood


 Negative (NEG)


 


 


 





 


Urine Nitrite


 Negative (NEG)


 


 


 





 


Urine Bilirubin


 Negative (NEG)


 


 


 





 


Urine Urobilinogen Dipstick


 1.0 mg/dL (0.2


mg/dL) 


 


 





 


Urine Leukocyte Esterase


 Negative (NEG)


 


 


 





 


Urine RBC 0 /HPF (0-2)     


 


Urine WBC 0 /HPF (0-4)     


 


Urine Bacteria


 0 /HPF (0-FEW)


 


 


 





 


Urine Mucus Marked /LPF     


 


Urine Opiates Screen Neg (NEG)     


 


Urine Methadone Screen Neg (NEG)     


 


Urine Barbiturates Neg (NEG)     


 


Urine Phencyclidine Screen Neg (NEG)     


 


Urine


Amphetamine/Methamphetamine Neg (NEG)  


 


 


 





 


Urine Benzodiazepines Screen Neg (NEG)     


 


Urine Cocaine Screen Neg (NEG)     


 


Urine Cannabinoids Screen Neg (NEG)     


 


Urine Ethyl Alcohol Neg (NEG)     





                                Laboratory Tests


9/3/19 18:55








                                Laboratory Tests


9/3/19 18:55











ASSESSMENT/PLAN


ASSESSMENT/PLAN


1.  Chest pain, atypical. AMI rule out


2.  Dizziness; tele noted with sinus bradycardia. Lowest 40. No pauses. 


3.  Hypertension; mildly elevated


4.  H/o tobaccoism; now vapes. Encouraged cessation








Recommendations





Lipid panel


ASA


Start low-dose lisinopril if BP remains mildly elevated. 


Echo to assess LV systolic function


Will arrange for outpatient event monitor


Avoid AV charmaine blocking agents


Outpatient stress test unless echo significant abnormal.





SAGRARIO PALM MD 9/5/19 0919:


CARDIAC CONSULT


ASSESSMENT/PLAN


ASSESSMENT/PLAN


Patient seen and examined 9/4/19.  Agree with NP's assessment and plan.


Chest pain with atypical features


Myocardial infarction has been ruled out


Check 2-D echo to assess LV systolic function and rule out wall motion 

abnormalities


We will consider ischemic evaluation with stress test and event monitor to rule 

out SSS as an outpatient


Thank you for your consultation











GUEVARA CHRISTENSEN            Sep 4, 2019 09:13


SAGRARIO PALM MD            Sep 5, 2019 09:19

## 2019-09-04 NOTE — NUR
Spoke with Dr. Paige regarding echo report not available on [facility], received orders that 
patient can be discharged and cardiology office will contact patient for event monitor and 
outpatient stress test. Paged Dr. Navarro and notified him.

## 2019-09-04 NOTE — DISCH
DISCHARGE INSTRUCTIONS


Condition on Discharge


Condition on Discharge:  Stable





Activity After Discharge


Activity Instructions for Disc:  Activity as tolerated, Avoid exertion


Driving Instructions after Dis:  Do not drive today





Diet after Discharge


Diet after Discharge:  Cardiac





Checks after Discharge


Checks after discharge:  Check blood press - daily





Contacting the  after DC


Call your doctor for:  If your condition worsens





Warfarin Follow-Up


Warfarin Follow UP:  see cardiology soon











EVERARDO HANNON MD           Sep 4, 2019 19:04

## 2019-09-04 NOTE — EKG
[facility]

              [address] [place] [address]

Test Date:    2019               Test Time:    18:51:12

Pat Name:     EMILY SYLVESTER               Department:   

Patient ID:   [id number]           Room:          

Gender:       M                        Technician:   

:          [id number]               Requested By: ARLEN NUNEZ

Order Number: [id number]           Reading MD:     

                                 Measurements

Intervals                              Axis          

Rate:         56                       P:            40

AK:           174                      QRS:          6

QRSD:         92                       T:            51

QT:           546                                    

QTc:          530                                    

                           Interpretive Statements

SINUS RHYTHM

PROLONGED QT

NO SPECIFIC ECG ABNORMALITIES

RI6.01

No previous ECG available for comparison

## 2019-09-04 NOTE — NUR
Patient arrived at 2150 and accompanied by ED nurse. Medications and history verified with 
patient. Patient denies any chest pain at this time. Patient accompanied by family. Patient 
provided hospital information.

## 2019-09-05 NOTE — CARD
MR#: [id number]

Account#: [id number]

Accession#: [id number]

Date of Study: 09/04/2019

Ordering Physician: GUEVARA CHRISTENSEN, 

Referring Physician: GUEVARA CHRISTENSEN, 

Tech: Narda Hill KRISHNA





--------------- APPROVED REPORT --------------





EXAM: Two-dimensional and M-mode echocardiogram with Doppler and color Doppler.



Other Information 

Quality : Good



INDICATION

Chest Pain 



2D DIMENSIONS 

RVDd3.0 (2.9-3.5cm)Left Atrium(2D)3.6 (1.6-4.0cm)

IVSd0.9 (0.7-1.1cm)Aortic Root(2D)2.7 (2.0-3.7cm)

LVDd5.3 (3.9-5.9cm)LVOT Diameter2.2 (1.8-2.4cm)

PWd1.0 (0.7-1.1cm)LVDs3.7 (2.5-4.0cm)

FS (%) 31.1 %SV79.8 ml

LVEF(%)58.4 (>50%)



Aortic Valve

AoV Peak Jose Maria.242.7cm/sAoV VTI55.3cm

AO Peak GR.23.6mmHgLVOT Peak Jose Maria.108.4cm/s

AO Mean GR.13mmHgAVA (VMAX)1.63cm2

AISLINN   (VTI)1.03oo7HN P 1/2 Iuco3147xr



Mitral Valve

MV E Zfgposmz53.4cm/sMV DECEL NWGZ394ts

MV A Ywgvwfip48.5cm/sE/A  Ratio1.4



Tricuspid Valve

TR P. Ktqernei969tw/sRAP QZCSQPRV5kmDo

TR Peak Gr.73jxJwNHUN92jjEw



Pulmonary Vein

S1 Ccnhppjr09.8cm/sD2 Ucllhams11.2cm/s



 LEFT VENTRICLE 

The left ventricle is normal size. There is normal left ventricular wall thickness. The left ventricu
lar systolic function is normal and the ejection fraction is within normal range. The Ejection Fracti
on is 55-60%. There is normal LV segmental wall motion. The left ventricular diastolic function and f
illing is normal for age.



 RIGHT VENTRICLE 

The right ventricle is normal size. The right ventricular systolic function is normal.



 ATRIA 

The left atrium size is normal. The right atrium size is normal. The interatrial septum is intact wit
h no evidence for an atrial septal defect or patent foramen ovale as noted on 2-D or Doppler imaging.




 AORTIC VALVE 

The aortic valve is functionally bicuspid with fusion of the right and left cusps. Doppler and Color 
Flow revealed mild aortic regurgitation. Calculated aortic valve area is 1.7 cm2 with maximum pressur
e gradient of 24 mmHg and mean pressure gradient of 17 mmHg. Doppler and color-flow analysis revealed
 mild aortic stenosis.



 MITRAL VALVE 

The mitral valve is normal in structure and function. There is no evidence of mitral valve prolapse. 
There is no mitral valve stenosis. Doppler and Color-flow revealed trace mitral regurgitation.



 TRICUSPID VALVE 

The tricuspid valve is normal in structure and function. Doppler and Color Flow revealed mild tricusp
id regurgitation. There is mild pulmonary hypertension. The PA pressure was estimated at 33 mmHg. The
re is no tricuspid valve stenosis.



 PULMONIC VALVE 

The pulmonary valve is normal in structure and function. Doppler and Color Flow revealed trace to mil
d pulmonic valvular regurgitation. There is no pulmonic valvular stenosis.



 GREAT VESSELS 

The aortic root is normal in size. The ascending aorta is normal in size. The IVC is normal in size a
nd collapses >50% with inspiration.



 PERICARDIAL EFFUSION 

There is no evidence of significant pericardial effusion.



Critical Notification

Critical Value: No



<Conclusion>

The left ventricular systolic function is normal and the ejection fraction is within normal range. Th
e Ejection Fraction is 55-60%.

There is normal LV segmental wall motion.

The aortic valve is functionally bicuspid with fusion of the right and left cusps. No significant carmen
nosis. 

Doppler and Color Flow revealed mild aortic regurgitation.



Signed by : Delano Diallo, 

Electronically Approved : 09/04/2019 12:18:37

## 2019-09-18 ENCOUNTER — HOSPITAL ENCOUNTER (OUTPATIENT)
Dept: HOSPITAL 61 - NM | Age: 49
Discharge: HOME | End: 2019-09-18
Attending: INTERNAL MEDICINE
Payer: COMMERCIAL

## 2019-09-18 DIAGNOSIS — R42: ICD-10-CM

## 2019-09-18 DIAGNOSIS — R06.09: ICD-10-CM

## 2019-09-18 DIAGNOSIS — R61: ICD-10-CM

## 2019-09-18 DIAGNOSIS — R07.9: Primary | ICD-10-CM

## 2019-09-18 PROCEDURE — 78452 HT MUSCLE IMAGE SPECT MULT: CPT

## 2019-09-18 PROCEDURE — 93017 CV STRESS TEST TRACING ONLY: CPT

## 2019-09-18 PROCEDURE — A9500 TC99M SESTAMIBI: HCPCS

## 2019-09-18 NOTE — RAD
MR#: [id number]

Account#: [id number]

Accession#: [id number]

Date of Study: 09/18/2019

Ordering Physician: SAGRARIO PALM, 

Referring Physician: ROSA M MEADOWS Tech: SRINATH Isbell ARRT (NANETTE) (N)





--------------- APPROVED REPORT --------------





Test Type:          Exercise

Stress Nurse/Tech: Opal Gutierrez R.N.

Test Indications: SMITH

Cardiac History: high chol

Medications:     see ehr

Medical History: see ehr

Resting ECG:     SB

Resting Heart Rate: 47 bpm

Resting Blood Pressure: 113/58mmHg

Pretest Chest Pain: No chest pain



Nurse/Tech Notes

lungs cta, heart tones reg

Consent: The procedure was explained to the patient in lay terms. Informed consent was witnessed. Dustin
eout was entered into [employer]. History and Stress Test performed by HOSSEIN Marcelino



Stress Symptoms

No chest pain or symptoms. Dizziness. During recovery pt became hypotensive with pressure dropping to
 81/43 with significant dizziness reported, pt very diaphoretic and clammy.  NS bolus 250cc given whi
le pt placed in reclining position.  Pt states this is how he feels after mowing the grass.  Pt recov
ered after fluids and was able to ambulate out of department without incident



POST EXERCISE

Reason for Termination: Reached target heart rate

Target HR: Yes

Max HR: 149 bpm

88% of Maximum Predicted HR: 170 bpm

Exercise duration: 13:30 min:sec, 4 Stage

Exercise capacity: 14.7METs

Max Blood Pressure: 124/84mmHg

Blood Pressure response to exercise: Normal blood pressure response during stress.

Heart Rate response to exercise: normal

Chest Pain: No. 

Arrhythmia: No. 

ST Change: No. 



INTERPRETATION

Stress EKG Conclusion: No evidence of stress induced EKG changes. 



Imaging Protocol

IMAGE PROTOCOL: Rest Tc-99m/stress Tc-99m 1 day



Rest:            Stress:         Viability:   

Radiopharm.Tc99m IwconquepXc64s Sestamibi

Dose10.3mCi            33mCi            

Img Date  09/18/2019 09/18/2019      

Inj-Img Knve17wzh.           45min.           



Rest Admin Site:IV - Right AntecubitalAdministrator:Yolette Varma, NMTCB, ARRT (R)(N)

Stress Admin Site: IV - Right AntecubitalAdministrator: HOSSEIN Marcelino



STRESS DATA

End Diast. Vol.150.0mlLVEDV index BSA69.0ml

End Syst. Vol.50.0mlLVESV index BSA23.0ml

Myocardial Mfkp530.0gEject. Qsuwarqy70.0%



Stress Scores

Regional WT0.00Summed WT2.00

Regional WM0.00Summed WM0.00



The rest and stress images show normal perfusion, normal contraction and thickening.



LV Perf. Quant

17 Seg. SSS0.00

17 Seg. SRS1.00

17 Seg. SDS0.00

Stress Defect Extent (% LAD)0.00Rest Defect Extent (% LAD)0.00Rev. Defect Extent (% LAD)0.00

Stress Defect Extent (% LCX) 0.00Rest Defect Extent (% LCX)6.30Rev. Defect Extent (% LCX)0.00

Stress Defect Extent (% RCA)0.00Rest Defect Extent (% RCA)0.00Rev. Defect Extent (% RCA)0.00

Stress Defect Extent (% BIANKA)1.30Rest Defect Extent (% BIANKA)1.10Rev. Defect Extent (% BIANKA)0.00



Other Information

Quality:Good

Risk Assessment: Low Risk



Conclusion

1. No evidence of EKG changes with stress testing.

2. *Hypotensive BP response after exercise in recovery, ? artifact versus dehydration. No obvious EKG
 changes with hypotension.

3. Normal perfusion at stress/rest.

4. Low risk study.

5. EF > 60%.



Signed by : Delano Diallo, 

Electronically Approved : 09/18/2019 12:30:42